# Patient Record
Sex: FEMALE | URBAN - METROPOLITAN AREA
[De-identification: names, ages, dates, MRNs, and addresses within clinical notes are randomized per-mention and may not be internally consistent; named-entity substitution may affect disease eponyms.]

---

## 2022-10-04 ENCOUNTER — OFFICE VISIT (OUTPATIENT)
Dept: URBAN - METROPOLITAN AREA CLINIC 60 | Facility: CLINIC | Age: 72
End: 2022-10-04

## 2023-03-20 ENCOUNTER — DASHBOARD ENCOUNTERS (OUTPATIENT)
Age: 73
End: 2023-03-20

## 2023-03-21 ENCOUNTER — OFFICE VISIT (OUTPATIENT)
Dept: URBAN - METROPOLITAN AREA CLINIC 63 | Facility: CLINIC | Age: 73
End: 2023-03-21

## 2023-03-21 RX ORDER — MENTHOL 40 MG/ML
APPLY TOPICALLY TO THE AFFECTED AREA THREE TIMES DAILY AS NEEDED FOR KNEE PAIN GEL TOPICAL
Qty: 89 MILLILITER | Refills: 0 | Status: ACTIVE | COMMUNITY

## 2023-03-21 RX ORDER — AMLODIPINE BESYLATE 10 MG/1
TABLET ORAL
Qty: 90 TABLET | Status: ACTIVE | COMMUNITY

## 2023-03-21 RX ORDER — CALCIUM ACETATE 667 MG/1
TAKE 2 CAPSULES BY MOUTH THREE TIMES DAILY WITH MEALS CAPSULE ORAL
Qty: 180 EACH | Refills: 0 | Status: ACTIVE | COMMUNITY

## 2023-03-21 RX ORDER — GABAPENTIN 100 MG/1
CAPSULE ORAL
Qty: 90 CAPSULE | Status: ACTIVE | COMMUNITY

## 2023-03-21 RX ORDER — CLONAZEPAM 0.25 MG/1
TABLET, ORALLY DISINTEGRATING ORAL
Qty: 30 TABLET | Status: ACTIVE | COMMUNITY

## 2023-03-21 RX ORDER — BENZONATATE 100 MG/1
TAKE 1 CAPSULE BY MOUTH THREE TIMES DAILY FOR UP TO 10 DAYS AS NEEDED FOR COUGH CAPSULE ORAL
Qty: 30 EACH | Refills: 0 | Status: ACTIVE | COMMUNITY

## 2023-03-21 RX ORDER — ATORVASTATIN CALCIUM 20 MG/1
TAKE 1 TABLET BY MOUTH EVERY DAY TABLET, FILM COATED ORAL
Qty: 90 EACH | Refills: 0 | Status: ACTIVE | COMMUNITY

## 2023-03-21 NOTE — HPI-TODAY'S VISIT:
73-year-old female with ESRD on hemodialysis who is referred to the office for heme positive stool. She was admitted to [facility] from February 1, 2023 to February 25, 2023.  Her admitting complaint was chest pain and shortness of breath.  She was found to have elevated troponin.  She underwent stress test which showed possible intracardiac mass.  She then had a coronary CTA which showed CAD and possible lipoma versus cardiac mass.  Left heart cath was done which showed severe two-vessel coronary artery disease not amenable to PCI as well as a possible left ventricle apical thrombus.  She was started on a heparin drip.  She was seen by cardiothoracic surgery but was not a candidate for CABG.  She then underwent left heart cath on 2/15/2023 with successful rotational atherectomy followed by PTA CEA with stenting of the mid to distal LAD. Prior to her discharge she developed recurrent chest pain and was found to have a hemoglobin of 6.9 on February 18 and was transfused 2 units prbcs.  Fecal occult blood test was positive.  She had no signs of overt bleeding.  She had a repeat cath on February 24 with PCI of the left circumflex artery.  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  She was in evaluated by neurology who recommended she continue aspirin and Plavix.  She was discharged to skilled nursing on February 25, 2023.